# Patient Record
Sex: MALE | Race: WHITE | NOT HISPANIC OR LATINO | Employment: OTHER | URBAN - METROPOLITAN AREA
[De-identification: names, ages, dates, MRNs, and addresses within clinical notes are randomized per-mention and may not be internally consistent; named-entity substitution may affect disease eponyms.]

---

## 2019-11-20 ENCOUNTER — TELEPHONE (OUTPATIENT)
Dept: NEUROLOGY | Facility: CLINIC | Age: 84
End: 2019-11-20

## 2019-11-20 NOTE — TELEPHONE ENCOUNTER
----- Message from Stephany Mcintyre sent at 11/18/2019  3:39 PM CST -----  Contact: pt@ [phone]  Received a call from Dr. Beltran asking to have patient wes Baeza mrn [phone] scheduled with a neurologist for dementia, asking to please have patient schedule and notified of the appt. Please call.

## 2020-01-21 ENCOUNTER — OFFICE VISIT (OUTPATIENT)
Dept: NEUROLOGY | Facility: CLINIC | Age: 85
End: 2020-01-21
Payer: MEDICARE

## 2020-01-21 VITALS
BODY MASS INDEX: 22.12 KG/M2 | SYSTOLIC BLOOD PRESSURE: 139 MMHG | HEART RATE: 64 BPM | WEIGHT: 158 LBS | DIASTOLIC BLOOD PRESSURE: 85 MMHG | HEIGHT: 71 IN

## 2020-01-21 DIAGNOSIS — F02.80 LATE ONSET ALZHEIMER'S DISEASE WITHOUT BEHAVIORAL DISTURBANCE: Primary | ICD-10-CM

## 2020-01-21 DIAGNOSIS — G30.1 LATE ONSET ALZHEIMER'S DISEASE WITHOUT BEHAVIORAL DISTURBANCE: Primary | ICD-10-CM

## 2020-01-21 PROCEDURE — 99999 PR PBB SHADOW E&M-EST. PATIENT-LVL III: ICD-10-PCS | Mod: PBBFAC,,, | Performed by: PSYCHIATRY & NEUROLOGY

## 2020-01-21 PROCEDURE — 1126F PR PAIN SEVERITY QUANTIFIED, NO PAIN PRESENT: ICD-10-PCS | Mod: ,,, | Performed by: PSYCHIATRY & NEUROLOGY

## 2020-01-21 PROCEDURE — 99205 OFFICE O/P NEW HI 60 MIN: CPT | Mod: S$PBB,,, | Performed by: PSYCHIATRY & NEUROLOGY

## 2020-01-21 PROCEDURE — 1159F PR MEDICATION LIST DOCUMENTED IN MEDICAL RECORD: ICD-10-PCS | Mod: ,,, | Performed by: PSYCHIATRY & NEUROLOGY

## 2020-01-21 PROCEDURE — 1126F AMNT PAIN NOTED NONE PRSNT: CPT | Mod: ,,, | Performed by: PSYCHIATRY & NEUROLOGY

## 2020-01-21 PROCEDURE — 99999 PR PBB SHADOW E&M-EST. PATIENT-LVL III: CPT | Mod: PBBFAC,,, | Performed by: PSYCHIATRY & NEUROLOGY

## 2020-01-21 PROCEDURE — 99205 PR OFFICE/OUTPT VISIT, NEW, LEVL V, 60-74 MIN: ICD-10-PCS | Mod: S$PBB,,, | Performed by: PSYCHIATRY & NEUROLOGY

## 2020-01-21 PROCEDURE — 1159F MED LIST DOCD IN RCRD: CPT | Mod: ,,, | Performed by: PSYCHIATRY & NEUROLOGY

## 2020-01-21 PROCEDURE — 99213 OFFICE O/P EST LOW 20 MIN: CPT | Mod: PBBFAC | Performed by: PSYCHIATRY & NEUROLOGY

## 2020-01-21 RX ORDER — CALCIUM CARBONATE 600 MG
TABLET ORAL
COMMUNITY

## 2020-01-21 RX ORDER — DONEPEZIL HYDROCHLORIDE 10 MG/1
10 TABLET, FILM COATED ORAL NIGHTLY
COMMUNITY

## 2020-01-21 NOTE — PATIENT INSTRUCTIONS
CONSIDER SPEAKING WITH DR. HASSAN ABOUT ADDITION OF MEMANTINE (NAMENDA) TO HELP WITH MEMORY AND WELLBUTRIN TO HELP WITH ANXIOUS FEELINGS

## 2020-01-21 NOTE — LETTER
January 22, 2020      Kedar Beltran MD  [address]  [facility] [address]           [facility] - Neurology  [address]  [facility] [address]  Phone: [phone]  Fax: [phone]          Patient: Zafar Baeza   MR Number: [id number]   YOB: 1935   Date of Visit: 1/21/2020       Dear Dr. Kedar Beltran:    Thank you for referring Zafar Baeza to me for evaluation. Attached you will find relevant portions of my assessment and plan of care.    If you have questions, please do not hesitate to call me. I look forward to following Zafar Baeza along with you.    Sincerely,    Rolando Hammonds MD    Enclosure  CC:  No Recipients    If you would like to receive this communication electronically, please contact [email or web address] or [phone] to request more information on [facility] access.    For providers and/or their staff who would like to refer a patient to [facility], please contact us through our one-stop-shop provider referral line, [facility], at [phone].    If you feel you have received this communication in error or would no longer like to receive these types of communications, please e-mail [email or web address]

## 2020-01-22 NOTE — PROGRESS NOTES
[facility]  [facility] - NEUROLOGY  [facility], [place]    Date: January 22, 2020   Patient Name: Zafar Baeza   MRN: [id number]   PCP: Juan Antonio Alexander  Referring Provider: Kedar Beltran,*    Assessment:      This is Zafar Baeza, 84 y.o. male with alzheimer v vascular dementia and some underlying anxiety, diagnosis reviewed in detail with son.  Outside records reviewed (media tab)     Plan:      -  Continue Donepezil 10mg/d  -  Advised to speak with neurologist near home about addition of namenda and/or wellbutrin    Follow up as needed       I discussed side effects of the medications. I asked the patient to stop the medication if He notices serious adverse effects as we discussed and to seek immediate medical attention at an ER.     Rolando Hammonds MD  [facility]   Department of Neurology    Subjective:      HPI:   Mr. Zafar Baeza is a 84 y.o. male who presents with a chief complaint of memory loss, presents with son who provides much of history    Patient is a retired  and family first noted memory difficulty when he became delirious during a hospital admit for revision of nissen fundoplication in 2014.  Memory difficulty has persisted although he continues to remain without delusions and lives independently with his wife in their home.  He enjoys activities such as doing yard work and chores around the house and goes fishing with his grandson.  He continues to drive short distances without issue.      He has had work up through outside neurologist near his home in [facility] including MRI brain, EEG, and memory labs.  Initial MOCA in 2018 was 16/30 and subsequent neuropsychology testing showed pronounced deficits.  He has had significant anxiety in recent years and cites things such as worry over hurting people's feelings by forgetting their names, checking locks repeatedly, difficulty recalling things he has read, remembering directions.   He was tried on zoloft but had difficulty tolerating, he continues on donepezil.  He also has some stress regarding his wife's health and some sleep disturbance as they have to wake up during the night for her to take medication for atrial fibrillation.    PAST MEDICAL HISTORY:  Past Medical History:   Diagnosis Date    CAD (coronary artery disease)     stetn 2000    GERD (gastroesophageal reflux disease)     HLD (hyperlipidemia)     HTN (hypertension)     Prostate cancer        PAST SURGICAL HISTORY:  Past Surgical History:   Procedure Laterality Date    CATARACT EXTRACTION      HIATAL HERNIA REPAIR  2010    Nissen fundoplication    INGUINAL HERNIA REPAIR      NISSEN FUNDOPLICATION  2010    re-do lap hiatal hernia with mesh and toupet fundoplication  2016       CURRENT MEDS:  Current Outpatient Medications   Medication Sig Dispense Refill    amlodipine (NORVASC) 10 MG tablet Take 10 mg by mouth once daily.      amlodipine (NORVASC) 5 MG tablet Take 5 mg by mouth once daily.       aspirin (ECOTRIN) 81 MG EC tablet Take 81 mg by mouth once daily.      atorvastatin (LIPITOR) 20 MG tablet Take 20 mg by mouth once daily.      calcium carbonate (CALCIUM 600) 600 mg calcium (1,500 mg) Tab Calcium 600   TAKE 1 TABLET BY MOUTH TWICE A DAILY      calcium carbonate (OS-ILENE) 600 mg (1,500 mg) Tab Take 600 mg by mouth 2 (two) times daily with meals.      clotrimazole-betamethasone 1-0.05% (LOTRISONE) cream       donepezil (ARICEPT) 10 MG tablet Take 10 mg by mouth every evening.      DURAFLU 45--500 mg Tab daily as needed.       esomeprazole (NEXIUM) 20 MG capsule Take 20 mg by mouth before breakfast.      garlic 1,000 mg Cap Take by mouth.      hydrocodone-acetaminophen (HYCET) solution 7.5-325 mg/15mL Take 15 mLs by mouth 4 (four) times daily as needed for Pain. 473 mL 0    ketoconazole (NIZORAL) 200 mg Tab       levofloxacin (LEVAQUIN) 500 MG tablet       multivitamin with minerals tablet Take  "1 tablet by mouth once daily.      timolol maleate 0.5% (TIMOPTIC) 0.5 % Drop 1 drop once daily.       vitamin D 1000 units Tab Take 185 mg by mouth once daily.       No current facility-administered medications for this visit.        ALLERGIES:  Review of patient's allergies indicates:   Allergen Reactions    Sulfur     Penicillins Rash       FAMILY HISTORY:  Family History   Problem Relation Age of Onset    Colon cancer Neg Hx        SOCIAL HISTORY:  Social History     Tobacco Use    Smoking status: Never Smoker   Substance Use Topics    Alcohol use: No     Alcohol/week: 0.0 standard drinks    Drug use: Not on file       Review of Systems:  12 review of systems is negative except for the symptoms mentioned in HPI.        Objective:     Vitals:    01/21/20 0841   BP: 139/85   Pulse: 64   Weight: 71.7 kg (158 lb)   Height: 5' 11" (1.803 m)       General: NAD, well nourished   Eyes: no tearing, discharge, no erythema   ENT: moist mucous membranes of the oral cavity, nares patent    Neck: Supple, full range of motion  Cardiovascular: Warm and well perfused, pulses equal and symmetrical  Lungs: Normal work of breathing, normal chest wall excursions  Skin: No rash, lesions, or breakdown on exposed skin  Psychiatry: Mood and affect are appropriate   Abdomen: soft, non tender, non distended  Extremeties: No cyanosis, clubbing or edema.    Neurological   MENTAL STATUS: Alert, calm, and appropriately conversant   CRANIAL NERVES: Visual fields intact. PERRL. EOMI. Facial sensation intact. Face symmetrical. Hearing grossly intact. Full shoulder shrug bilaterally. Tongue protrudes midline   SENSORY: Sensation is intact to light touch throughout. Negative Romberg.   MOTOR: Normal bulk and tone. No pronator drift.     CEREBELLAR/COORDINATION/GAIT: Gait steady with normal arm swing and stride length.  Finger to nose intact. Normal rapid alternating movements.     "